# Patient Record
(demographics unavailable — no encounter records)

---

## 2025-03-14 NOTE — HISTORY OF PRESENT ILLNESS
[FreeTextEntry1] : 39-year-old female -0-0-2 presenting office for her annual well woman appointment.  She has no changes in her medical history since our last appointment.  She has a primary care physician and a  neurologist.  Obstetrical history of 2 prior  sections, last with the addition of a bilateral salpingectomy.  Gynecologic history of an abnormal Pap smear in 2017 with high risk HPV detected; her last Pap smear 2024 resulted NILM and negative high risk HPV.  Medical history of Hashimoto's thyroiditis and she is followed by endocrinology and currently on Synthroid 112 mcg.  Additional surgical history of bilateral hernia repair when she was a child.  Family history significant for a grandmother with breast cancer, in her 70s.  No other first-degree relatives.  Denies alcohol, tobacco, illicit drug use.  She is active and athletic.  Denies allergies to medications.

## 2025-03-14 NOTE — PHYSICAL EXAM
[Appropriately responsive] : appropriately responsive [Alert] : alert [No Acute Distress] : no acute distress [Soft] : soft [Non-tender] : non-tender [Non-distended] : non-distended [No HSM] : No HSM [No Lesions] : no lesions [No Mass] : no mass [Oriented x3] : oriented x3 [FreeTextEntry7] : Pfannenstiel incision healed well [Examination Of The Breasts] : a normal appearance [Breast Palpation Diffuse Fibrous Tissue Bilateral] : fibrocystic changes [No Masses] : no breast masses were palpable [Labia Majora] : normal [Labia Minora] : normal [Normal] : normal [FreeTextEntry5] : Cervical Pap smear performed

## 2025-03-14 NOTE — PLAN
[FreeTextEntry1] : Clinical breast exam performed and significant for bilateral breast pain and fibrocystic breast changes, self breast examination reviewed along with indications for early screening.  Considering patient's family history, bilateral breast pain, and fibrocystic breast changes a mammogram and ultrasound were placed in the system and she will have these performed at her convenience.  She is not currently on contraception as she underwent a bilateral salpingectomy during her last  section.  She was however written for combined oral contraceptive as she has an upcoming vacation and did not wish to have her menstrual period.  She has been directed on this medication.  Her cervical Pap smear performed importance of maintaining yearly well reports and cervical Pap smears reviewed.  She is to continue with her PCP and neurologist as directed.  She was given opportunity ask questions all questions were addressed.  She understands plan of care.